# Patient Record
Sex: FEMALE | Race: OTHER | HISPANIC OR LATINO
[De-identification: names, ages, dates, MRNs, and addresses within clinical notes are randomized per-mention and may not be internally consistent; named-entity substitution may affect disease eponyms.]

---

## 2017-08-19 ENCOUNTER — APPOINTMENT (OUTPATIENT)
Dept: DERMATOLOGY | Facility: CLINIC | Age: 37
End: 2017-08-19

## 2017-12-09 ENCOUNTER — APPOINTMENT (OUTPATIENT)
Dept: DERMATOLOGY | Facility: CLINIC | Age: 37
End: 2017-12-09

## 2019-03-07 ENCOUNTER — APPOINTMENT (OUTPATIENT)
Dept: UROLOGY | Facility: CLINIC | Age: 39
End: 2019-03-07
Payer: COMMERCIAL

## 2019-03-07 VITALS
HEART RATE: 79 BPM | DIASTOLIC BLOOD PRESSURE: 87 MMHG | RESPIRATION RATE: 14 BRPM | SYSTOLIC BLOOD PRESSURE: 136 MMHG | HEIGHT: 64 IN | WEIGHT: 130 LBS | BODY MASS INDEX: 22.2 KG/M2

## 2019-03-07 DIAGNOSIS — Z87.39 PERSONAL HISTORY OF OTHER DISEASES OF THE MUSCULOSKELETAL SYSTEM AND CONNECTIVE TISSUE: ICD-10-CM

## 2019-03-07 DIAGNOSIS — Z86.79 PERSONAL HISTORY OF OTHER DISEASES OF THE CIRCULATORY SYSTEM: ICD-10-CM

## 2019-03-07 DIAGNOSIS — Z78.9 OTHER SPECIFIED HEALTH STATUS: ICD-10-CM

## 2019-03-07 LAB
BILIRUB UR QL STRIP: NORMAL
CLARITY UR: CLEAR
COLLECTION METHOD: NORMAL
GLUCOSE UR-MCNC: NORMAL
HCG UR QL: 0.2 EU/DL
HGB UR QL STRIP.AUTO: NORMAL
KETONES UR-MCNC: NORMAL
LEUKOCYTE ESTERASE UR QL STRIP: NORMAL
NITRITE UR QL STRIP: POSITIVE
PH UR STRIP: 7
PROT UR STRIP-MCNC: NORMAL
SP GR UR STRIP: 1.01

## 2019-03-07 PROCEDURE — 81003 URINALYSIS AUTO W/O SCOPE: CPT | Mod: QW

## 2019-03-07 PROCEDURE — 99203 OFFICE O/P NEW LOW 30 MIN: CPT | Mod: 25

## 2019-03-07 RX ORDER — NITROFURANTOIN (MONOHYDRATE/MACROCRYSTALS) 25; 75 MG/1; MG/1
100 CAPSULE ORAL TWICE DAILY
Qty: 14 | Refills: 1 | Status: ACTIVE | COMMUNITY
Start: 2019-03-07 | End: 1900-01-01

## 2019-03-07 NOTE — LETTER BODY
[Dear  ___] : Dear ~DAVID, [Consult Letter:] : I had the pleasure of evaluating your patient, [unfilled]. [( Thank you for referring [unfilled] for consultation for _____ )] : Thank you for referring [unfilled] for consultation for [unfilled] [Please see my note below.] : Please see my note below. [Consult Closing:] : Thank you very much for allowing me to participate in the care of this patient.  If you have any questions, please do not hesitate to contact me. [Sincerely,] : Sincerely, [FreeTextEntry3] : Kartik Hernandez MD\par  of Urology\par [facility]\par \par Offices:\par [facility] @\par [address], [place] [address]\par and\par [address], [place] [address], [address]\par and\par [address] [address]\par \par TEL: [phone]\par FAX: [phone]

## 2019-03-07 NOTE — ASSESSMENT
[FreeTextEntry1] : Reviewed outside records. \par Renal Ultrasound(2/7/2019):\par 1.6 cm and 7 mm right kidney stone, no hydronephrosis.\par \par Kidney stone:\par Will get CT scan. \par Nitrofurantoin 100 mg BID x 7 days. \par Will get Urinalysis and Urine culture.\par \par Recommended that patient call us or report to Emergency room if condition worsens- develops high grade fever, persistent nausea and vomiting, uncontrolled pain.\par \par Return to office after CT scan. \par

## 2019-03-07 NOTE — HISTORY OF PRESENT ILLNESS
[FreeTextEntry1] : 39 yo female presents for Kidney stone. \par Has been having off and on right flank pain, had Renal Ultrasound- right kidney stone. \par Has prior history of kidney stone, s/p PCNL at [facility]. No family history. \par Denies any difficulty with urination. Nocturia 1 x. \par Denies dysuria, hematuria, fever, chills or rigors.

## 2019-03-07 NOTE — PHYSICAL EXAM
[General Appearance - Well Developed] : well developed [Normal Appearance] : normal appearance [General Appearance - In No Acute Distress] : no acute distress [] : no respiratory distress [Abdomen Soft] : soft [Abdomen Tenderness] : non-tender [Abdomen Mass (___ Cm)] : no abdominal mass palpated [FreeTextEntry1] : Right CVAT + [Normal Station and Gait] : the gait and station were normal for the patient's age [Skin Color & Pigmentation] : normal skin color and pigmentation [No Focal Deficits] : no focal deficits [Oriented To Time, Place, And Person] : oriented to person, place, and time [No Palpable Adenopathy] : no palpable adenopathy

## 2019-03-08 LAB
APPEARANCE: ABNORMAL
BACTERIA: ABNORMAL
BILIRUBIN URINE: NEGATIVE
BLOOD URINE: NEGATIVE
COLOR: YELLOW
GLUCOSE QUALITATIVE U: NEGATIVE
HYALINE CASTS: 0 /LPF
KETONES URINE: NEGATIVE
LEUKOCYTE ESTERASE URINE: ABNORMAL
MICROSCOPIC-UA: NORMAL
NITRITE URINE: POSITIVE
PH URINE: 7.5
PROTEIN URINE: NEGATIVE
RED BLOOD CELLS URINE: 2 /HPF
SPECIFIC GRAVITY URINE: 1.02
SQUAMOUS EPITHELIAL CELLS: 1 /HPF
UROBILINOGEN URINE: NORMAL
WHITE BLOOD CELLS URINE: 22 /HPF

## 2019-03-11 LAB — BACTERIA UR CULT: ABNORMAL

## 2019-03-11 RX ORDER — NITROFURANTOIN (MONOHYDRATE/MACROCRYSTALS) 25; 75 MG/1; MG/1
100 CAPSULE ORAL TWICE DAILY
Qty: 14 | Refills: 1 | Status: ACTIVE | COMMUNITY
Start: 2019-03-11 | End: 1900-01-01

## 2019-03-21 ENCOUNTER — APPOINTMENT (OUTPATIENT)
Age: 39
End: 2019-03-21
Payer: COMMERCIAL

## 2019-03-21 VITALS — HEIGHT: 64 IN | BODY MASS INDEX: 22.2 KG/M2 | RESPIRATION RATE: 14 BRPM | HEART RATE: 80 BPM | WEIGHT: 130 LBS

## 2019-03-21 DIAGNOSIS — N39.0 URINARY TRACT INFECTION, SITE NOT SPECIFIED: ICD-10-CM

## 2019-03-21 PROCEDURE — 51701 INSERT BLADDER CATHETER: CPT

## 2019-03-21 PROCEDURE — 99214 OFFICE O/P EST MOD 30 MIN: CPT | Mod: 25

## 2019-03-21 NOTE — ASSESSMENT
[FreeTextEntry1] : UTI:\par Got catheterized specimen- Will get Urinalysis and Urine culture. Will inform results.\par \par Ureteral stone:\par Discussed the options of continued medical management Vs Ureteroscopy Vs Shock wave lithotripsy Vs Percutaneous nephrolithotomy. \par Risks and benefits of each modality were discussed. \par Explained to the pt that there is 50-60% chance to pass the stone of this size with tolerable symptoms on medical management. \par Pt wants to try medical management. \par Good oral hydration\par Continue Flomax\par PRN pain medication\par Strain urine\par Call for fevers, chill, severe nausea and vomiting, or severe pain or go to ER for worsening of medical condition may need urgent ureteral stent placement. \par \par Kidney stone:\par Will need Percutaneous nephrolithotomy, will arrange for it at later date. \par \par Return to office in 3 weeks or sooner if any issues- will get Renal and Bladder Ultrasound.

## 2019-03-21 NOTE — HISTORY OF PRESENT ILLNESS
[FreeTextEntry1] : 39 yo female presents for follow up. \par After last visit treated for positive Urine culture. \par Still has off and on right lower abdominal pain. \par Denies dysuria, hematuria, fever, chills or rigors. \par \par Initially seen for Kidney stone. \par Had been having off and on right flank pain, had Renal Ultrasound- right kidney stone. \par Has prior history of kidney stone, s/p PCNL at [facility]. No family history. \par

## 2019-03-22 LAB
APPEARANCE: CLEAR
BACTERIA: ABNORMAL
BILIRUBIN URINE: NEGATIVE
BLOOD URINE: ABNORMAL
COLOR: YELLOW
GLUCOSE QUALITATIVE U: NEGATIVE
HYALINE CASTS: 0 /LPF
KETONES URINE: NEGATIVE
LEUKOCYTE ESTERASE URINE: ABNORMAL
MICROSCOPIC-UA: NORMAL
NITRITE URINE: POSITIVE
PH URINE: 6.5
PROTEIN URINE: NEGATIVE
RED BLOOD CELLS URINE: 3 /HPF
SPECIFIC GRAVITY URINE: 1.02
SQUAMOUS EPITHELIAL CELLS: 2 /HPF
UROBILINOGEN URINE: NORMAL
WHITE BLOOD CELLS URINE: 32 /HPF

## 2019-03-25 LAB — BACTERIA UR CULT: ABNORMAL

## 2019-03-25 RX ORDER — NITROFURANTOIN (MONOHYDRATE/MACROCRYSTALS) 25; 75 MG/1; MG/1
100 CAPSULE ORAL TWICE DAILY
Qty: 20 | Refills: 1 | Status: ACTIVE | COMMUNITY
Start: 2019-03-25 | End: 1900-01-01

## 2019-03-26 DIAGNOSIS — N20.1 CALCULUS OF URETER: ICD-10-CM

## 2019-04-02 ENCOUNTER — OTHER (OUTPATIENT)
Age: 39
End: 2019-04-02

## 2019-04-02 ENCOUNTER — APPOINTMENT (OUTPATIENT)
Age: 39
End: 2019-04-02

## 2019-04-02 ENCOUNTER — OUTPATIENT (OUTPATIENT)
Dept: OUTPATIENT SERVICES | Facility: HOSPITAL | Age: 39
LOS: 1 days | Discharge: HOME | End: 2019-04-02
Payer: COMMERCIAL

## 2019-04-02 ENCOUNTER — RESULT REVIEW (OUTPATIENT)
Age: 39
End: 2019-04-02

## 2019-04-02 VITALS
OXYGEN SATURATION: 100 % | HEART RATE: 65 BPM | DIASTOLIC BLOOD PRESSURE: 85 MMHG | HEIGHT: 64 IN | WEIGHT: 130.07 LBS | RESPIRATION RATE: 18 BRPM | SYSTOLIC BLOOD PRESSURE: 135 MMHG | TEMPERATURE: 99 F

## 2019-04-02 VITALS
RESPIRATION RATE: 14 BRPM | HEART RATE: 70 BPM | TEMPERATURE: 98 F | OXYGEN SATURATION: 100 % | DIASTOLIC BLOOD PRESSURE: 84 MMHG | SYSTOLIC BLOOD PRESSURE: 138 MMHG

## 2019-04-02 DIAGNOSIS — Z87.442 PERSONAL HISTORY OF URINARY CALCULI: Chronic | ICD-10-CM

## 2019-04-02 DIAGNOSIS — Z90.49 ACQUIRED ABSENCE OF OTHER SPECIFIED PARTS OF DIGESTIVE TRACT: Chronic | ICD-10-CM

## 2019-04-02 DIAGNOSIS — Z98.891 HISTORY OF UTERINE SCAR FROM PREVIOUS SURGERY: Chronic | ICD-10-CM

## 2019-04-02 LAB
ABO RH CONFIRMATION: SIGNIFICANT CHANGE UP
BLD GP AB SCN SERPL QL: SIGNIFICANT CHANGE UP
HCG UR QL: NEGATIVE — SIGNIFICANT CHANGE UP
INR BLD: 1.04 RATIO — SIGNIFICANT CHANGE UP (ref 0.88–1.16)
PROTHROM AB SERPL-ACNC: 11.6 SEC — SIGNIFICANT CHANGE UP (ref 10–12.9)
SURGICAL PATHOLOGY STUDY: SIGNIFICANT CHANGE UP
TYPE + AB SCN PNL BLD: SIGNIFICANT CHANGE UP

## 2019-04-02 PROCEDURE — 88300 SURGICAL PATH GROSS: CPT | Mod: 26

## 2019-04-02 PROCEDURE — 52356 CYSTO/URETERO W/LITHOTRIPSY: CPT | Mod: RT

## 2019-04-02 RX ORDER — SODIUM CHLORIDE 9 MG/ML
1000 INJECTION, SOLUTION INTRAVENOUS
Qty: 0 | Refills: 0 | Status: DISCONTINUED | OUTPATIENT
Start: 2019-04-02 | End: 2019-04-02

## 2019-04-02 RX ORDER — PHENAZOPYRIDINE HCL 100 MG
1 TABLET ORAL
Qty: 9 | Refills: 0
Start: 2019-04-02 | End: 2019-04-04

## 2019-04-02 RX ORDER — PHENAZOPYRIDINE HCL 100 MG
200 TABLET ORAL ONCE
Qty: 0 | Refills: 0 | Status: COMPLETED | OUTPATIENT
Start: 2019-04-02 | End: 2019-04-02

## 2019-04-02 RX ORDER — SODIUM CHLORIDE 9 MG/ML
3 INJECTION INTRAMUSCULAR; INTRAVENOUS; SUBCUTANEOUS EVERY 8 HOURS
Qty: 0 | Refills: 0 | Status: DISCONTINUED | OUTPATIENT
Start: 2019-04-02 | End: 2019-04-02

## 2019-04-02 RX ORDER — ONDANSETRON 8 MG/1
4 TABLET, FILM COATED ORAL ONCE
Qty: 0 | Refills: 0 | Status: DISCONTINUED | OUTPATIENT
Start: 2019-04-02 | End: 2019-04-02

## 2019-04-02 RX ORDER — ACETAMINOPHEN 500 MG
975 TABLET ORAL ONCE
Qty: 0 | Refills: 0 | Status: COMPLETED | OUTPATIENT
Start: 2019-04-02 | End: 2019-04-02

## 2019-04-02 RX ORDER — OXYCODONE HYDROCHLORIDE 5 MG/1
5 TABLET ORAL ONCE
Qty: 0 | Refills: 0 | Status: DISCONTINUED | OUTPATIENT
Start: 2019-04-02 | End: 2019-04-02

## 2019-04-02 RX ORDER — FENTANYL CITRATE 50 UG/ML
50 INJECTION INTRAVENOUS
Qty: 0 | Refills: 0 | Status: DISCONTINUED | OUTPATIENT
Start: 2019-04-02 | End: 2019-04-02

## 2019-04-02 RX ORDER — FAMOTIDINE 10 MG/ML
20 INJECTION INTRAVENOUS ONCE
Qty: 0 | Refills: 0 | Status: COMPLETED | OUTPATIENT
Start: 2019-04-02 | End: 2019-04-02

## 2019-04-02 RX ADMIN — Medication 975 MILLIGRAM(S): at 10:00

## 2019-04-02 RX ADMIN — Medication 200 MILLIGRAM(S): at 13:35

## 2019-04-02 RX ADMIN — OXYCODONE HYDROCHLORIDE 5 MILLIGRAM(S): 5 TABLET ORAL at 13:35

## 2019-04-02 RX ADMIN — SODIUM CHLORIDE 100 MILLILITER(S): 9 INJECTION, SOLUTION INTRAVENOUS at 13:12

## 2019-04-02 RX ADMIN — FAMOTIDINE 20 MILLIGRAM(S): 10 INJECTION INTRAVENOUS at 10:00

## 2019-04-02 RX ADMIN — Medication 975 MILLIGRAM(S): at 10:01

## 2019-04-02 NOTE — ASU DISCHARGE PLAN (ADULT/PEDIATRIC) - CALL YOUR DOCTOR IF YOU HAVE ANY OF THE FOLLOWING:
Nausea and vomiting that does not stop/Pain not relieved by Medications/Inability to tolerate liquids or foods/Fever greater than (need to indicate Fahrenheit or Celsius)

## 2019-04-02 NOTE — BRIEF OPERATIVE NOTE - NSICDXBRIEFPOSTOP_GEN_ALL_CORE_FT
POST-OP DIAGNOSIS:  Right ureteral stone 02-Apr-2019 13:10:39  Kartik Hernandez  Right kidney stone 02-Apr-2019 13:10:32  Kartik Hernandez

## 2019-04-02 NOTE — ASU DISCHARGE PLAN (ADULT/PEDIATRIC) - NURSING INSTRUCTIONS
Review the post surgery written discharge  fact sheet.  Call MD if unable to urinate in 8 hours   A responsible adult should stay with you today.

## 2019-04-02 NOTE — BRIEF OPERATIVE NOTE - NSICDXBRIEFPROCEDURE_GEN_ALL_CORE_FT
PROCEDURES:  Ureteroscopy with laser lithotripsy and stent placement 02-Apr-2019 13:11:42 Right Kartik Hernandez

## 2019-04-02 NOTE — BRIEF OPERATIVE NOTE - NSICDXBRIEFPREOP_GEN_ALL_CORE_FT
PRE-OP DIAGNOSIS:  Right kidney stone 02-Apr-2019 13:10:23  Kartik Hernandez  Right ureteral stone 02-Apr-2019 13:10:02  Kartik Hernandez

## 2019-04-04 LAB
-  AMIKACIN: SIGNIFICANT CHANGE UP
-  AMOXICILLIN/CLAVULANIC ACID: SIGNIFICANT CHANGE UP
-  AMPICILLIN/SULBACTAM: SIGNIFICANT CHANGE UP
-  AMPICILLIN: SIGNIFICANT CHANGE UP
-  AMPICILLIN: SIGNIFICANT CHANGE UP
-  AZTREONAM: SIGNIFICANT CHANGE UP
-  CEFAZOLIN: SIGNIFICANT CHANGE UP
-  CEFAZOLIN: SIGNIFICANT CHANGE UP
-  CEFEPIME: SIGNIFICANT CHANGE UP
-  CEFOTAXIME: SIGNIFICANT CHANGE UP
-  CEFOXITIN: SIGNIFICANT CHANGE UP
-  CEFTAZIDIME: SIGNIFICANT CHANGE UP
-  CEFTRIAXONE: SIGNIFICANT CHANGE UP
-  CEFTRIAXONE: SIGNIFICANT CHANGE UP
-  CEFUROXIME: SIGNIFICANT CHANGE UP
-  CEPHALOTHIN: SIGNIFICANT CHANGE UP
-  CIPROFLOXACIN: SIGNIFICANT CHANGE UP
-  ERTAPENEM: SIGNIFICANT CHANGE UP
-  GENTAMICIN: SIGNIFICANT CHANGE UP
-  IMIPENEM: SIGNIFICANT CHANGE UP
-  LEVOFLOXACIN: SIGNIFICANT CHANGE UP
-  MEROPENEM: SIGNIFICANT CHANGE UP
-  NITROFURANTOIN: SIGNIFICANT CHANGE UP
-  NITROFURANTOIN: SIGNIFICANT CHANGE UP
-  PIPERACILLIN/TAZOBACTAM: SIGNIFICANT CHANGE UP
-  TETRACYCLINE: SIGNIFICANT CHANGE UP
-  TIGECYCLINE: SIGNIFICANT CHANGE UP
-  TOBRAMYCIN: SIGNIFICANT CHANGE UP
-  TRIMETHOPRIM/SULFAMETHOXAZOLE: SIGNIFICANT CHANGE UP
CULTURE RESULTS: SIGNIFICANT CHANGE UP
METHOD TYPE: SIGNIFICANT CHANGE UP
ORGANISM # SPEC MICROSCOPIC CNT: SIGNIFICANT CHANGE UP
ORGANISM # SPEC MICROSCOPIC CNT: SIGNIFICANT CHANGE UP
SPECIMEN SOURCE: SIGNIFICANT CHANGE UP

## 2019-04-08 DIAGNOSIS — Z90.49 ACQUIRED ABSENCE OF OTHER SPECIFIED PARTS OF DIGESTIVE TRACT: ICD-10-CM

## 2019-04-08 DIAGNOSIS — N20.2 CALCULUS OF KIDNEY WITH CALCULUS OF URETER: ICD-10-CM

## 2019-04-08 DIAGNOSIS — Z88.0 ALLERGY STATUS TO PENICILLIN: ICD-10-CM

## 2019-04-08 DIAGNOSIS — I10 ESSENTIAL (PRIMARY) HYPERTENSION: ICD-10-CM

## 2019-04-08 DIAGNOSIS — N39.0 URINARY TRACT INFECTION, SITE NOT SPECIFIED: ICD-10-CM

## 2019-04-08 LAB — COMPN STONE: SIGNIFICANT CHANGE UP

## 2019-04-11 ENCOUNTER — APPOINTMENT (OUTPATIENT)
Dept: UROLOGY | Facility: CLINIC | Age: 39
End: 2019-04-11

## 2019-04-17 ENCOUNTER — MEDICATION RENEWAL (OUTPATIENT)
Age: 39
End: 2019-04-17

## 2019-04-30 PROBLEM — I10 ESSENTIAL (PRIMARY) HYPERTENSION: Chronic | Status: ACTIVE | Noted: 2019-04-02

## 2019-04-30 PROBLEM — N20.0 CALCULUS OF KIDNEY: Chronic | Status: ACTIVE | Noted: 2019-04-02

## 2019-05-02 ENCOUNTER — APPOINTMENT (OUTPATIENT)
Dept: UROLOGY | Facility: CLINIC | Age: 39
End: 2019-05-02

## 2019-05-02 ENCOUNTER — OTHER (OUTPATIENT)
Age: 39
End: 2019-05-02

## 2019-05-09 ENCOUNTER — APPOINTMENT (OUTPATIENT)
Dept: UROLOGY | Facility: CLINIC | Age: 39
End: 2019-05-09
Payer: COMMERCIAL

## 2019-05-09 VITALS
HEIGHT: 64 IN | WEIGHT: 120 LBS | BODY MASS INDEX: 20.49 KG/M2 | RESPIRATION RATE: 14 BRPM | HEART RATE: 60 BPM | SYSTOLIC BLOOD PRESSURE: 138 MMHG | DIASTOLIC BLOOD PRESSURE: 95 MMHG

## 2019-05-09 PROCEDURE — 99214 OFFICE O/P EST MOD 30 MIN: CPT

## 2019-05-13 NOTE — HISTORY OF PRESENT ILLNESS
[FreeTextEntry1] : 39 yo female presents for follow up. \par Patient primarily Cook Islander speaking, with limited English. Visit conducted with help of Cook Islander speaking staff.\par s/p Right Ureteroscopy, laser lithotripsy, stone extraction and ureteral stent placement done on 4/2/19 at [facility]. \par Has right sided abdominal pressure, off and on dysuria and hematuria.\par Denies fever, chills or rigors. \par Here to discuss further management of right staghorn calculus.\par \par Initially seen for Kidney stone. \par Had been having off and on right flank pain, had Renal Ultrasound- right kidney stone. \par Has prior history of kidney stone, s/p PCNL at [facility]. No family history. \par

## 2019-05-13 NOTE — ASSESSMENT
[FreeTextEntry1] : Right Kidney stone:\par Discussed the management options for kidney stone- Intervention via Percutaneous nephrolithotomy  Vs Extracorporeal shock wave lithotripsy Vs Ureteroscopy.\par Risks and benefits of each modality were discussed. Recommended Percutaneous nephrolithotomy. \par Risks/benefits/success/recovery expectations all reviewed at length inclusive of infection/sepsis, bleeding, need for secondary procedures or secondary stages such as embolization or open surgery, and even risks of death due to acute issues superimposed on comorbidities. \par \par Pt scheduled for Percutaneous nephrolithotomy with Dr Hoenig at [facility] on 5/24/19.

## 2019-05-16 ENCOUNTER — MEDICATION RENEWAL (OUTPATIENT)
Age: 39
End: 2019-05-16

## 2019-05-16 RX ORDER — TAMSULOSIN HYDROCHLORIDE 0.4 MG/1
0.4 CAPSULE ORAL
Qty: 30 | Refills: 0 | Status: ACTIVE | COMMUNITY
Start: 2019-03-21 | End: 1900-01-01

## 2019-05-17 ENCOUNTER — OUTPATIENT (OUTPATIENT)
Dept: OUTPATIENT SERVICES | Facility: HOSPITAL | Age: 39
LOS: 1 days | End: 2019-05-17
Payer: COMMERCIAL

## 2019-05-17 VITALS
OXYGEN SATURATION: 100 % | SYSTOLIC BLOOD PRESSURE: 138 MMHG | TEMPERATURE: 99 F | HEART RATE: 85 BPM | RESPIRATION RATE: 14 BRPM | DIASTOLIC BLOOD PRESSURE: 89 MMHG | WEIGHT: 126.99 LBS | HEIGHT: 62 IN

## 2019-05-17 VITALS
RESPIRATION RATE: 15 BRPM | OXYGEN SATURATION: 100 % | HEART RATE: 85 BPM | TEMPERATURE: 99 F | SYSTOLIC BLOOD PRESSURE: 138 MMHG | HEIGHT: 62 IN | WEIGHT: 126.99 LBS | DIASTOLIC BLOOD PRESSURE: 83 MMHG

## 2019-05-17 DIAGNOSIS — N20.0 CALCULUS OF KIDNEY: ICD-10-CM

## 2019-05-17 DIAGNOSIS — Z90.49 ACQUIRED ABSENCE OF OTHER SPECIFIED PARTS OF DIGESTIVE TRACT: Chronic | ICD-10-CM

## 2019-05-17 DIAGNOSIS — Z98.890 OTHER SPECIFIED POSTPROCEDURAL STATES: Chronic | ICD-10-CM

## 2019-05-17 DIAGNOSIS — Z87.442 PERSONAL HISTORY OF URINARY CALCULI: Chronic | ICD-10-CM

## 2019-05-17 DIAGNOSIS — I10 ESSENTIAL (PRIMARY) HYPERTENSION: ICD-10-CM

## 2019-05-17 DIAGNOSIS — Z98.891 HISTORY OF UTERINE SCAR FROM PREVIOUS SURGERY: Chronic | ICD-10-CM

## 2019-05-17 DIAGNOSIS — Z01.818 ENCOUNTER FOR OTHER PREPROCEDURAL EXAMINATION: ICD-10-CM

## 2019-05-17 LAB
ANION GAP SERPL CALC-SCNC: 10 MMOL/L — SIGNIFICANT CHANGE UP (ref 5–17)
BLD GP AB SCN SERPL QL: NEGATIVE — SIGNIFICANT CHANGE UP
BUN SERPL-MCNC: 10 MG/DL — SIGNIFICANT CHANGE UP (ref 7–23)
CALCIUM SERPL-MCNC: 9.1 MG/DL — SIGNIFICANT CHANGE UP (ref 8.4–10.5)
CHLORIDE SERPL-SCNC: 104 MMOL/L — SIGNIFICANT CHANGE UP (ref 96–108)
CO2 SERPL-SCNC: 25 MMOL/L — SIGNIFICANT CHANGE UP (ref 22–31)
CREAT SERPL-MCNC: 0.69 MG/DL — SIGNIFICANT CHANGE UP (ref 0.5–1.3)
GLUCOSE SERPL-MCNC: 86 MG/DL — SIGNIFICANT CHANGE UP (ref 70–99)
POTASSIUM SERPL-MCNC: 4 MMOL/L — SIGNIFICANT CHANGE UP (ref 3.5–5.3)
POTASSIUM SERPL-SCNC: 4 MMOL/L — SIGNIFICANT CHANGE UP (ref 3.5–5.3)
RH IG SCN BLD-IMP: POSITIVE — SIGNIFICANT CHANGE UP
SODIUM SERPL-SCNC: 139 MMOL/L — SIGNIFICANT CHANGE UP (ref 135–145)

## 2019-05-17 PROCEDURE — 86850 RBC ANTIBODY SCREEN: CPT

## 2019-05-17 PROCEDURE — 87086 URINE CULTURE/COLONY COUNT: CPT

## 2019-05-17 PROCEDURE — 86901 BLOOD TYPING SEROLOGIC RH(D): CPT

## 2019-05-17 PROCEDURE — 87186 SC STD MICRODIL/AGAR DIL: CPT

## 2019-05-17 PROCEDURE — G0463: CPT

## 2019-05-17 PROCEDURE — 85027 COMPLETE CBC AUTOMATED: CPT

## 2019-05-17 PROCEDURE — 86900 BLOOD TYPING SEROLOGIC ABO: CPT

## 2019-05-17 PROCEDURE — 80048 BASIC METABOLIC PNL TOTAL CA: CPT

## 2019-05-17 RX ORDER — METOPROLOL TARTRATE 50 MG
1 TABLET ORAL
Qty: 0 | Refills: 0 | DISCHARGE

## 2019-05-17 NOTE — H&P PST ADULT - LANGUAGE ASSISTANCE NEEDED
Yes-Patient/Caregiver accepts free interpretation services...
Yes-Patient/Caregiver accepts free interpretation services...

## 2019-05-17 NOTE — H&P PST ADULT - NSANTHOSAYNRD_GEN_A_CORE
No. OLIVE screening performed.  STOP BANG Legend: 0-2 = LOW Risk; 3-4 = INTERMEDIATE Risk; 5-8 = HIGH Risk
No. OLIVE screening performed.  STOP BANG Legend: 0-2 = LOW Risk; 3-4 = INTERMEDIATE Risk; 5-8 = HIGH Risk

## 2019-05-17 NOTE — H&P PST ADULT - NSICDXPASTMEDICALHX_GEN_ALL_CORE_FT
PAST MEDICAL HISTORY:  HTN (hypertension)     Kidney stones
PAST MEDICAL HISTORY:  HTN (hypertension)     Kidney stones

## 2019-05-17 NOTE — H&P PST ADULT - NSICDXPASTSURGICALHX_GEN_ALL_CORE_FT
PAST SURGICAL HISTORY:  H/O:  section     History of appendectomy     History of kidney stones right PCNL    S/P hernia repair right femoral

## 2019-05-17 NOTE — H&P PST ADULT - HISTORY OF PRESENT ILLNESS
39 y/o   37 yo female presents for follow up.   Patient primarily Yi speaking, with limited English. Visit conducted with help of Yi speaking staff.  s/p Right Ureteroscopy, laser lithotripsy, stone extraction and ureteral stent placement done on 4/2/19 at [facility].   Has right sided abdominal pressure, off and on dysuria and hematuria.  Denies fever, chills or rigors.   Here to discuss further management of right staghorn calculus.    Initially seen for Kidney stone.   Had been having off and on right flank pain, had Renal Ultrasound- right kidney stone.   Has prior history of kidney stone, s/p PCNL at [facility]. No family history. 37 yo female presents for follow up.   Patient primarily Syrian speaking, with limited English. Visit conducted with help of Syrian speaking staff.  s/p Right Ureteroscopy, laser lithotripsy, stone extraction and ureteral stent placement done on 4/2/19 at [facility].   Has right sided abdominal pressure, off and on dysuria and hematuria.  Denies fever, chills or rigors.   Here to discuss further management of right staghorn calculus.    Initially seen for Kidney stone.   Had been having off and on right flank pain, had Renal Ultrasound- right kidney stone.   Has prior history of kidney stone, s/p PCNL at [facility]. No family history.

## 2019-05-17 NOTE — H&P PST ADULT - ATTENDING COMMENTS
Urine C&S positive for e coli, treated with cefdinir.  For right PCNL given large stones, possible multi-access, multistage for staghorn stone

## 2019-05-17 NOTE — H&P PST ADULT - HISTORY OF PRESENT ILLNESS
39 y/o Luxembourgish speaking female,  # [id number], w/ pmhx of HTN, renal stones s/p right ureteroscopy, laser lithotripsy, stone extraction and ureteral stent placement done on 4/2/19 at [facility] for right staghorn calculus. Reports continued right CVA tenderness but denies recent fever/chills, dysuria, gross hematuria. Presents now for right ureteroscopy, right PCNL. 37 y/o Belarusian speaking female,  # [id number], w/ pmhx of HTN, renal stones s/p right ureteroscopy, laser lithotripsy, stone extraction and ureteral stent placement done on 4/2/19 at [facility] for right calculus. Reports continued right CVA tenderness but denies recent fever/chills, dysuria, gross hematuria. Presents now for right ureteroscopy, right PCNL.

## 2019-05-17 NOTE — H&P PST ADULT - CONSTITUTIONAL DETAILS
well-nourished/well-developed/no distress/well-groomed
no distress/well-developed/well-groomed/well-nourished

## 2019-05-17 NOTE — H&P PST ADULT - NSICDXPROBLEM_GEN_ALL_CORE_FT
PROBLEM DIAGNOSES  Problem: Kidney stones  Assessment and Plan: Scheduled right PCNL  urine cx collected      Problem: HTN (hypertension)  Assessment and Plan: c/w metoprolol as prescribed
PROBLEM DIAGNOSES  Problem: Kidney stones  Assessment and Plan: Scheduled right PCNL  urine cx collected      Problem: HTN (hypertension)  Assessment and Plan: c/w metoprolol as prescribed

## 2019-05-17 NOTE — H&P PST ADULT - NEGATIVE GENERAL GENITOURINARY SYMPTOMS
no hematuria/no renal colic/no flank pain L/no dysuria/normal urinary frequency/no urinary hesitancy/no bladder infections/no incontinence/no nocturia
no dysuria/no nocturia/no incontinence/no hematuria/no bladder infections

## 2019-05-17 NOTE — H&P PST ADULT - NSICDXPASTSURGICALHX_GEN_ALL_CORE_FT
PAST SURGICAL HISTORY:  H/O:  section     History of appendectomy     History of kidney stones s/p litho    S/P hernia repair right femoral PAST SURGICAL HISTORY:  H/O:  section     History of appendectomy     History of kidney stones right PCNL    S/P hernia repair right femoral

## 2019-05-18 LAB
HCT VFR BLD CALC: 34.7 % — SIGNIFICANT CHANGE UP (ref 34.5–45)
HGB BLD-MCNC: 10.8 G/DL — LOW (ref 11.5–15.5)
MCHC RBC-ENTMCNC: 26.3 PG — LOW (ref 27–34)
MCHC RBC-ENTMCNC: 31.1 GM/DL — LOW (ref 32–36)
MCV RBC AUTO: 84.6 FL — SIGNIFICANT CHANGE UP (ref 80–100)
PLATELET # BLD AUTO: 345 K/UL — SIGNIFICANT CHANGE UP (ref 150–400)
RBC # BLD: 4.1 M/UL — SIGNIFICANT CHANGE UP (ref 3.8–5.2)
RBC # FLD: 14.6 % — HIGH (ref 10.3–14.5)
WBC # BLD: 6.88 K/UL — SIGNIFICANT CHANGE UP (ref 3.8–10.5)
WBC # FLD AUTO: 6.88 K/UL — SIGNIFICANT CHANGE UP (ref 3.8–10.5)

## 2019-05-19 LAB
-  AMIKACIN: SIGNIFICANT CHANGE UP
-  AMPICILLIN/SULBACTAM: SIGNIFICANT CHANGE UP
-  AMPICILLIN: SIGNIFICANT CHANGE UP
-  AZTREONAM: SIGNIFICANT CHANGE UP
-  CEFEPIME: SIGNIFICANT CHANGE UP
-  CEFOXITIN: SIGNIFICANT CHANGE UP
-  CEFTRIAXONE: SIGNIFICANT CHANGE UP
-  CIPROFLOXACIN: SIGNIFICANT CHANGE UP
-  ERTAPENEM: SIGNIFICANT CHANGE UP
-  GENTAMICIN: SIGNIFICANT CHANGE UP
-  IMIPENEM: SIGNIFICANT CHANGE UP
-  LEVOFLOXACIN: SIGNIFICANT CHANGE UP
-  MEROPENEM: SIGNIFICANT CHANGE UP
-  NITROFURANTOIN: SIGNIFICANT CHANGE UP
-  PIPERACILLIN/TAZOBACTAM: SIGNIFICANT CHANGE UP
-  TIGECYCLINE: SIGNIFICANT CHANGE UP
-  TOBRAMYCIN: SIGNIFICANT CHANGE UP
-  TRIMETHOPRIM/SULFAMETHOXAZOLE: SIGNIFICANT CHANGE UP
CULTURE RESULTS: SIGNIFICANT CHANGE UP
METHOD TYPE: SIGNIFICANT CHANGE UP
ORGANISM # SPEC MICROSCOPIC CNT: SIGNIFICANT CHANGE UP
ORGANISM # SPEC MICROSCOPIC CNT: SIGNIFICANT CHANGE UP
SPECIMEN SOURCE: SIGNIFICANT CHANGE UP

## 2019-05-20 RX ORDER — CEFDINIR 300 MG/1
300 CAPSULE ORAL
Qty: 14 | Refills: 0 | Status: ACTIVE | COMMUNITY
Start: 2019-05-20 | End: 1900-01-01

## 2019-05-23 ENCOUNTER — RESULT REVIEW (OUTPATIENT)
Age: 39
End: 2019-05-23

## 2019-05-23 ENCOUNTER — TRANSCRIPTION ENCOUNTER (OUTPATIENT)
Age: 39
End: 2019-05-23

## 2019-05-23 VITALS
SYSTOLIC BLOOD PRESSURE: 138 MMHG | HEART RATE: 85 BPM | HEIGHT: 63 IN | TEMPERATURE: 99 F | DIASTOLIC BLOOD PRESSURE: 89 MMHG | RESPIRATION RATE: 18 BRPM | OXYGEN SATURATION: 100 % | WEIGHT: 126.99 LBS

## 2019-05-23 NOTE — PRE-ANESTHESIA EVALUATION ADULT - NSANTHOSAYNRD_GEN_A_CORE
No. OLIVE screening performed.  STOP BANG Legend: 0-2 = LOW Risk; 3-4 = INTERMEDIATE Risk; 5-8 = HIGH Risk

## 2019-05-23 NOTE — PRE-ANESTHESIA EVALUATION ADULT - NSPROPOSEDPROCEDFT_GEN_ALL_CORE
Cystoscopy, R Retrograde pyelogram, R percutaneous nephrostolithotomy Cystoscopy, Right Retrograde pyelogram, Right percutaneous nephrostolithotomy

## 2019-05-24 ENCOUNTER — APPOINTMENT (OUTPATIENT)
Dept: UROLOGY | Facility: HOSPITAL | Age: 39
End: 2019-05-24

## 2019-05-24 ENCOUNTER — INPATIENT (INPATIENT)
Facility: HOSPITAL | Age: 39
LOS: 1 days | Discharge: HOME | DRG: 660 | End: 2019-05-26
Attending: UROLOGY | Admitting: UROLOGY
Payer: COMMERCIAL

## 2019-05-24 DIAGNOSIS — Z98.890 OTHER SPECIFIED POSTPROCEDURAL STATES: Chronic | ICD-10-CM

## 2019-05-24 DIAGNOSIS — N20.0 CALCULUS OF KIDNEY: ICD-10-CM

## 2019-05-24 DIAGNOSIS — Z87.442 PERSONAL HISTORY OF URINARY CALCULI: Chronic | ICD-10-CM

## 2019-05-24 DIAGNOSIS — Z90.49 ACQUIRED ABSENCE OF OTHER SPECIFIED PARTS OF DIGESTIVE TRACT: Chronic | ICD-10-CM

## 2019-05-24 DIAGNOSIS — Z98.891 HISTORY OF UTERINE SCAR FROM PREVIOUS SURGERY: Chronic | ICD-10-CM

## 2019-05-24 DIAGNOSIS — Z01.818 ENCOUNTER FOR OTHER PREPROCEDURAL EXAMINATION: ICD-10-CM

## 2019-05-24 LAB
ANION GAP SERPL CALC-SCNC: 12 MMOL/L — SIGNIFICANT CHANGE UP (ref 5–17)
BUN SERPL-MCNC: 12 MG/DL — SIGNIFICANT CHANGE UP (ref 7–23)
CALCIUM SERPL-MCNC: 7.9 MG/DL — LOW (ref 8.4–10.5)
CHLORIDE SERPL-SCNC: 108 MMOL/L — SIGNIFICANT CHANGE UP (ref 96–108)
CO2 SERPL-SCNC: 18 MMOL/L — LOW (ref 22–31)
CREAT SERPL-MCNC: 0.73 MG/DL — SIGNIFICANT CHANGE UP (ref 0.5–1.3)
GLUCOSE SERPL-MCNC: 107 MG/DL — HIGH (ref 70–99)
HCG UR QL: NEGATIVE — SIGNIFICANT CHANGE UP
HCT VFR BLD CALC: 30.8 % — LOW (ref 34.5–45)
HGB BLD-MCNC: 10.5 G/DL — LOW (ref 11.5–15.5)
MCHC RBC-ENTMCNC: 28.4 PG — SIGNIFICANT CHANGE UP (ref 27–34)
MCHC RBC-ENTMCNC: 34 GM/DL — SIGNIFICANT CHANGE UP (ref 32–36)
MCV RBC AUTO: 83.4 FL — SIGNIFICANT CHANGE UP (ref 80–100)
PLATELET # BLD AUTO: 230 K/UL — SIGNIFICANT CHANGE UP (ref 150–400)
POTASSIUM SERPL-MCNC: 4.4 MMOL/L — SIGNIFICANT CHANGE UP (ref 3.5–5.3)
POTASSIUM SERPL-SCNC: 4.4 MMOL/L — SIGNIFICANT CHANGE UP (ref 3.5–5.3)
RBC # BLD: 3.69 M/UL — LOW (ref 3.8–5.2)
RBC # FLD: 14.6 % — HIGH (ref 10.3–14.5)
RH IG SCN BLD-IMP: POSITIVE — SIGNIFICANT CHANGE UP
SODIUM SERPL-SCNC: 138 MMOL/L — SIGNIFICANT CHANGE UP (ref 135–145)
WBC # BLD: 3.7 K/UL — LOW (ref 3.8–10.5)
WBC # FLD AUTO: 3.7 K/UL — LOW (ref 3.8–10.5)

## 2019-05-24 PROCEDURE — 88300 SURGICAL PATH GROSS: CPT | Mod: 26

## 2019-05-24 PROCEDURE — 71045 X-RAY EXAM CHEST 1 VIEW: CPT | Mod: 26

## 2019-05-24 RX ORDER — METOPROLOL TARTRATE 50 MG
1 TABLET ORAL
Qty: 0 | Refills: 0 | DISCHARGE

## 2019-05-24 RX ORDER — CIPROFLOXACIN LACTATE 400MG/40ML
400 VIAL (ML) INTRAVENOUS EVERY 12 HOURS
Refills: 0 | Status: DISCONTINUED | OUTPATIENT
Start: 2019-05-24 | End: 2019-05-24

## 2019-05-24 RX ORDER — SODIUM CHLORIDE 9 MG/ML
1000 INJECTION INTRAMUSCULAR; INTRAVENOUS; SUBCUTANEOUS ONCE
Refills: 0 | Status: COMPLETED | OUTPATIENT
Start: 2019-05-24 | End: 2019-05-24

## 2019-05-24 RX ORDER — LIDOCAINE HCL 20 MG/ML
0.2 VIAL (ML) INJECTION ONCE
Refills: 0 | Status: DISCONTINUED | OUTPATIENT
Start: 2019-05-24 | End: 2019-05-24

## 2019-05-24 RX ORDER — HYDROMORPHONE HYDROCHLORIDE 2 MG/ML
0.5 INJECTION INTRAMUSCULAR; INTRAVENOUS; SUBCUTANEOUS
Refills: 0 | Status: DISCONTINUED | OUTPATIENT
Start: 2019-05-24 | End: 2019-05-24

## 2019-05-24 RX ORDER — KETOROLAC TROMETHAMINE 30 MG/ML
15 SYRINGE (ML) INJECTION EVERY 6 HOURS
Refills: 0 | Status: DISCONTINUED | OUTPATIENT
Start: 2019-05-24 | End: 2019-05-26

## 2019-05-24 RX ORDER — CEFTRIAXONE 500 MG/1
1 INJECTION, POWDER, FOR SOLUTION INTRAMUSCULAR; INTRAVENOUS EVERY 24 HOURS
Refills: 0 | Status: DISCONTINUED | OUTPATIENT
Start: 2019-05-24 | End: 2019-05-26

## 2019-05-24 RX ORDER — HEPARIN SODIUM 5000 [USP'U]/ML
5000 INJECTION INTRAVENOUS; SUBCUTANEOUS EVERY 8 HOURS
Refills: 0 | Status: DISCONTINUED | OUTPATIENT
Start: 2019-05-24 | End: 2019-05-26

## 2019-05-24 RX ORDER — ONDANSETRON 8 MG/1
4 TABLET, FILM COATED ORAL ONCE
Refills: 0 | Status: DISCONTINUED | OUTPATIENT
Start: 2019-05-24 | End: 2019-05-24

## 2019-05-24 RX ORDER — SODIUM CHLORIDE 9 MG/ML
3 INJECTION INTRAMUSCULAR; INTRAVENOUS; SUBCUTANEOUS EVERY 8 HOURS
Refills: 0 | Status: DISCONTINUED | OUTPATIENT
Start: 2019-05-24 | End: 2019-05-24

## 2019-05-24 RX ORDER — OXYCODONE HYDROCHLORIDE 5 MG/1
5 TABLET ORAL EVERY 4 HOURS
Refills: 0 | Status: DISCONTINUED | OUTPATIENT
Start: 2019-05-24 | End: 2019-05-26

## 2019-05-24 RX ORDER — METOPROLOL TARTRATE 50 MG
25 TABLET ORAL DAILY
Refills: 0 | Status: DISCONTINUED | OUTPATIENT
Start: 2019-05-24 | End: 2019-05-26

## 2019-05-24 RX ORDER — TAMSULOSIN HYDROCHLORIDE 0.4 MG/1
1 CAPSULE ORAL
Qty: 0 | Refills: 0 | DISCHARGE

## 2019-05-24 RX ORDER — SODIUM CHLORIDE 9 MG/ML
1000 INJECTION, SOLUTION INTRAVENOUS
Refills: 0 | Status: DISCONTINUED | OUTPATIENT
Start: 2019-05-24 | End: 2019-05-25

## 2019-05-24 RX ORDER — FAMOTIDINE 10 MG/ML
20 INJECTION INTRAVENOUS ONCE
Refills: 0 | Status: DISCONTINUED | OUTPATIENT
Start: 2019-05-24 | End: 2019-05-26

## 2019-05-24 RX ORDER — ACETAMINOPHEN 500 MG
1000 TABLET ORAL ONCE
Refills: 0 | Status: COMPLETED | OUTPATIENT
Start: 2019-05-24 | End: 2019-05-24

## 2019-05-24 RX ORDER — ACETAMINOPHEN 500 MG
650 TABLET ORAL EVERY 6 HOURS
Refills: 0 | Status: COMPLETED | OUTPATIENT
Start: 2019-05-24 | End: 2019-05-26

## 2019-05-24 RX ORDER — CEFTRIAXONE 500 MG/1
1000 INJECTION, POWDER, FOR SOLUTION INTRAMUSCULAR; INTRAVENOUS EVERY 24 HOURS
Refills: 0 | Status: DISCONTINUED | OUTPATIENT
Start: 2019-05-24 | End: 2019-05-24

## 2019-05-24 RX ORDER — METHENAMINE MANDELATE 1 G
1 TABLET ORAL
Qty: 0 | Refills: 0 | DISCHARGE

## 2019-05-24 RX ORDER — HYDROMORPHONE HYDROCHLORIDE 2 MG/ML
0.25 INJECTION INTRAMUSCULAR; INTRAVENOUS; SUBCUTANEOUS
Refills: 0 | Status: DISCONTINUED | OUTPATIENT
Start: 2019-05-24 | End: 2019-05-24

## 2019-05-24 RX ORDER — CIPROFLOXACIN LACTATE 400MG/40ML
400 VIAL (ML) INTRAVENOUS ONCE
Refills: 0 | Status: DISCONTINUED | OUTPATIENT
Start: 2019-05-24 | End: 2019-05-24

## 2019-05-24 RX ADMIN — HYDROMORPHONE HYDROCHLORIDE 0.25 MILLIGRAM(S): 2 INJECTION INTRAMUSCULAR; INTRAVENOUS; SUBCUTANEOUS at 10:45

## 2019-05-24 RX ADMIN — CEFTRIAXONE 100 GRAM(S): 500 INJECTION, POWDER, FOR SOLUTION INTRAMUSCULAR; INTRAVENOUS at 14:50

## 2019-05-24 RX ADMIN — Medication 15 MILLIGRAM(S): at 22:03

## 2019-05-24 RX ADMIN — Medication 15 MILLIGRAM(S): at 15:20

## 2019-05-24 RX ADMIN — Medication 15 MILLIGRAM(S): at 22:35

## 2019-05-24 RX ADMIN — Medication 1000 MILLIGRAM(S): at 11:15

## 2019-05-24 RX ADMIN — SODIUM CHLORIDE 1000 MILLILITER(S): 9 INJECTION INTRAMUSCULAR; INTRAVENOUS; SUBCUTANEOUS at 18:27

## 2019-05-24 RX ADMIN — Medication 15 MILLIGRAM(S): at 14:50

## 2019-05-24 RX ADMIN — HYDROMORPHONE HYDROCHLORIDE 0.25 MILLIGRAM(S): 2 INJECTION INTRAMUSCULAR; INTRAVENOUS; SUBCUTANEOUS at 11:00

## 2019-05-24 RX ADMIN — HEPARIN SODIUM 5000 UNIT(S): 5000 INJECTION INTRAVENOUS; SUBCUTANEOUS at 14:49

## 2019-05-24 RX ADMIN — Medication 650 MILLIGRAM(S): at 18:57

## 2019-05-24 RX ADMIN — Medication 650 MILLIGRAM(S): at 18:27

## 2019-05-24 RX ADMIN — HEPARIN SODIUM 5000 UNIT(S): 5000 INJECTION INTRAVENOUS; SUBCUTANEOUS at 22:05

## 2019-05-24 RX ADMIN — Medication 400 MILLIGRAM(S): at 10:56

## 2019-05-24 RX ADMIN — SODIUM CHLORIDE 125 MILLILITER(S): 9 INJECTION, SOLUTION INTRAVENOUS at 10:56

## 2019-05-24 NOTE — BRIEF OPERATIVE NOTE - COMMENTS
Patient required nephrostomy tube to control bleeding at the conclusion of the case and will require inpatient management    Patient had preoperative positive urine culture and will require inpatient IV antibiotics

## 2019-05-24 NOTE — PROVIDER CONTACT NOTE (OTHER) - ASSESSMENT
pt complaining of numbness throughout body. VSS. Neurovascular assessment intact. Pt has yet to get OOB post procedure.

## 2019-05-24 NOTE — PROGRESS NOTE ADULT - SUBJECTIVE AND OBJECTIVE BOX
Post op Check    Pt seen and examined without complaints. Pain is controlled. Denies SOB/CP/N/V.     Vital Signs Last 24 Hrs  T(C): 36.4 (24 May 2019 15:00), Max: 37 (23 May 2019 19:37)  T(F): 97.6 (24 May 2019 15:00), Max: 98.4 (24 May 2019 06:11)  HR: 91 (24 May 2019 15:00) (66 - 98)  BP: 92/63 (24 May 2019 15:00) (92/63 - 138/89)  BP(mean): 78 (24 May 2019 11:15) (78 - 90)  RR: 18 (24 May 2019 15:00) (15 - 20)  SpO2: 99% (24 May 2019 15:00) (97% - 100%)    I&O's Summary    24 May 2019 07:01  -  24 May 2019 15:44  --------------------------------------------------------  IN: 400 mL / OUT: 875 mL / NET: -475 mL        Physical Exam  Gen: awake alert NAD AXOX3  Pulm: No respiratory distress, no subcostal retractions  CV: RRR, no JVD  Abd: Soft, NT, ND  Back: tubes in place no hematoma/ ecchymosis  dressings saturated with serosang output.   outputs dilute cherry    :   seth strawberry                           10.5   3.7   )-----------( 230      ( 24 May 2019 11:01 )             30.8       05-24    138  |  108  |  12  ----------------------------<  107<H>  4.4   |  18<L>  |  0.73    Ca    7.9<L>      24 May 2019 11:01

## 2019-05-24 NOTE — BRIEF OPERATIVE NOTE - OPERATION/FINDINGS
pre/post-op: R nephrolithiasis  procedure: cysto, R stent removal, R retrograde pyelogram  R PCNL, 2 sticks, 2 dilations, , nephrostogram, insertion of right nephrostomy tube, insertion of right nephroureteral tube

## 2019-05-25 LAB
ANION GAP SERPL CALC-SCNC: 11 MMOL/L — SIGNIFICANT CHANGE UP (ref 5–17)
BUN SERPL-MCNC: 10 MG/DL — SIGNIFICANT CHANGE UP (ref 7–23)
CALCIUM SERPL-MCNC: 7.9 MG/DL — LOW (ref 8.4–10.5)
CHLORIDE SERPL-SCNC: 110 MMOL/L — HIGH (ref 96–108)
CO2 SERPL-SCNC: 20 MMOL/L — LOW (ref 22–31)
CREAT SERPL-MCNC: 0.86 MG/DL — SIGNIFICANT CHANGE UP (ref 0.5–1.3)
GLUCOSE SERPL-MCNC: 102 MG/DL — HIGH (ref 70–99)
HCT VFR BLD CALC: 23.6 % — LOW (ref 34.5–45)
HCT VFR BLD CALC: 25.1 % — LOW (ref 34.5–45)
HGB BLD-MCNC: 7.5 G/DL — LOW (ref 11.5–15.5)
HGB BLD-MCNC: 8 G/DL — LOW (ref 11.5–15.5)
MCHC RBC-ENTMCNC: 26.7 PG — LOW (ref 27–34)
MCHC RBC-ENTMCNC: 26.9 PG — LOW (ref 27–34)
MCHC RBC-ENTMCNC: 31.9 GM/DL — LOW (ref 32–36)
MCHC RBC-ENTMCNC: 31.9 GM/DL — LOW (ref 32–36)
MCV RBC AUTO: 83.9 FL — SIGNIFICANT CHANGE UP (ref 80–100)
MCV RBC AUTO: 84.4 FL — SIGNIFICANT CHANGE UP (ref 80–100)
PLATELET # BLD AUTO: 169 K/UL — SIGNIFICANT CHANGE UP (ref 150–400)
PLATELET # BLD AUTO: 190 K/UL — SIGNIFICANT CHANGE UP (ref 150–400)
POTASSIUM SERPL-MCNC: 3.7 MMOL/L — SIGNIFICANT CHANGE UP (ref 3.5–5.3)
POTASSIUM SERPL-SCNC: 3.7 MMOL/L — SIGNIFICANT CHANGE UP (ref 3.5–5.3)
RBC # BLD: 2.8 M/UL — LOW (ref 3.8–5.2)
RBC # BLD: 2.99 M/UL — LOW (ref 3.8–5.2)
RBC # FLD: 14.6 % — HIGH (ref 10.3–14.5)
RBC # FLD: 14.7 % — HIGH (ref 10.3–14.5)
SODIUM SERPL-SCNC: 141 MMOL/L — SIGNIFICANT CHANGE UP (ref 135–145)
WBC # BLD: 19.2 K/UL — HIGH (ref 3.8–10.5)
WBC # BLD: 26.2 K/UL — HIGH (ref 3.8–10.5)
WBC # FLD AUTO: 19.2 K/UL — HIGH (ref 3.8–10.5)
WBC # FLD AUTO: 26.2 K/UL — HIGH (ref 3.8–10.5)

## 2019-05-25 PROCEDURE — 74176 CT ABD & PELVIS W/O CONTRAST: CPT | Mod: 26

## 2019-05-25 RX ORDER — SODIUM CHLORIDE 9 MG/ML
1000 INJECTION, SOLUTION INTRAVENOUS
Refills: 0 | Status: DISCONTINUED | OUTPATIENT
Start: 2019-05-25 | End: 2019-05-25

## 2019-05-25 RX ADMIN — Medication 15 MILLIGRAM(S): at 06:30

## 2019-05-25 RX ADMIN — Medication 15 MILLIGRAM(S): at 13:45

## 2019-05-25 RX ADMIN — HEPARIN SODIUM 5000 UNIT(S): 5000 INJECTION INTRAVENOUS; SUBCUTANEOUS at 05:57

## 2019-05-25 RX ADMIN — HEPARIN SODIUM 5000 UNIT(S): 5000 INJECTION INTRAVENOUS; SUBCUTANEOUS at 13:15

## 2019-05-25 RX ADMIN — Medication 15 MILLIGRAM(S): at 18:01

## 2019-05-25 RX ADMIN — Medication 15 MILLIGRAM(S): at 05:55

## 2019-05-25 RX ADMIN — CEFTRIAXONE 100 GRAM(S): 500 INJECTION, POWDER, FOR SOLUTION INTRAMUSCULAR; INTRAVENOUS at 13:15

## 2019-05-25 RX ADMIN — Medication 15 MILLIGRAM(S): at 13:15

## 2019-05-25 RX ADMIN — Medication 650 MILLIGRAM(S): at 00:11

## 2019-05-25 RX ADMIN — HEPARIN SODIUM 5000 UNIT(S): 5000 INJECTION INTRAVENOUS; SUBCUTANEOUS at 22:00

## 2019-05-25 RX ADMIN — Medication 650 MILLIGRAM(S): at 00:50

## 2019-05-25 RX ADMIN — Medication 15 MILLIGRAM(S): at 18:31

## 2019-05-25 NOTE — PROGRESS NOTE ADULT - SUBJECTIVE AND OBJECTIVE BOX
Subjective    Objective    Vital signs  T(F): , Max: 98.5 (05-24-19 @ 16:00)  HR: 86 (05-25-19 @ 05:28)  BP: 102/68 (05-25-19 @ 05:28)  SpO2: 98% (05-25-19 @ 05:28)  Wt(kg): --    Output     05-24 @ 07:01  -  05-25 @ 07:00  --------------------------------------------------------  IN: 3385 mL / OUT: 4275 mL / NET: -890 mL        Gen awake alert nad axox3  Abd  Back      Labs      05-24 @ 11:01    WBC 3.7   / Hct 30.8  / SCr 0.73       Urine Cx: ?  Blood Cx: ?    Imaging Subjective  feeling well without complaints; pain is controlled   Objective    Vital signs  T(F): , Max: 98.5 (05-24-19 @ 16:00)  HR: 86 (05-25-19 @ 05:28)  BP: 102/68 (05-25-19 @ 05:28)  SpO2: 98% (05-25-19 @ 05:28)  Wt(kg): --    Output     05-24 @ 07:01  -  05-25 @ 07:00  --------------------------------------------------------  IN: 3385 mL / OUT: 4275 mL / NET: -890 mL        Gen awake alert nad axox3  Abd soft flat ntnd   Back tubes in place no hematoma/ ecchymosis  urine urines dilute strawberry    seth yellow     Labs      05-24 @ 11:01    WBC 3.7   / Hct 30.8  / SCr 0.73         Imaging  p

## 2019-05-26 ENCOUNTER — TRANSCRIPTION ENCOUNTER (OUTPATIENT)
Age: 39
End: 2019-05-26

## 2019-05-26 VITALS
OXYGEN SATURATION: 98 % | SYSTOLIC BLOOD PRESSURE: 128 MMHG | DIASTOLIC BLOOD PRESSURE: 88 MMHG | RESPIRATION RATE: 17 BRPM | TEMPERATURE: 98 F | HEART RATE: 90 BPM

## 2019-05-26 LAB
CULTURE RESULTS: SIGNIFICANT CHANGE UP
CULTURE RESULTS: SIGNIFICANT CHANGE UP
HCT VFR BLD CALC: 26.1 % — LOW (ref 34.5–45)
HGB BLD-MCNC: 8.4 G/DL — LOW (ref 11.5–15.5)
MCHC RBC-ENTMCNC: 26.9 PG — LOW (ref 27–34)
MCHC RBC-ENTMCNC: 32 GM/DL — SIGNIFICANT CHANGE UP (ref 32–36)
MCV RBC AUTO: 84.1 FL — SIGNIFICANT CHANGE UP (ref 80–100)
PLATELET # BLD AUTO: 179 K/UL — SIGNIFICANT CHANGE UP (ref 150–400)
RBC # BLD: 3.1 M/UL — LOW (ref 3.8–5.2)
RBC # FLD: 14.6 % — HIGH (ref 10.3–14.5)
SPECIMEN SOURCE: SIGNIFICANT CHANGE UP
SPECIMEN SOURCE: SIGNIFICANT CHANGE UP
WBC # BLD: 19 K/UL — HIGH (ref 3.8–10.5)
WBC # FLD AUTO: 19 K/UL — HIGH (ref 3.8–10.5)

## 2019-05-26 RX ORDER — CEFDINIR 250 MG/5ML
1 POWDER, FOR SUSPENSION ORAL
Qty: 10 | Refills: 0
Start: 2019-05-26 | End: 2019-05-30

## 2019-05-26 RX ORDER — OXYCODONE HYDROCHLORIDE 5 MG/1
1 TABLET ORAL
Qty: 12 | Refills: 0
Start: 2019-05-26

## 2019-05-26 RX ORDER — DOCUSATE SODIUM 100 MG
1 CAPSULE ORAL
Qty: 0 | Refills: 0 | DISCHARGE

## 2019-05-26 RX ADMIN — Medication 15 MILLIGRAM(S): at 03:30

## 2019-05-26 RX ADMIN — Medication 15 MILLIGRAM(S): at 09:26

## 2019-05-26 RX ADMIN — Medication 15 MILLIGRAM(S): at 02:43

## 2019-05-26 RX ADMIN — CEFTRIAXONE 100 GRAM(S): 500 INJECTION, POWDER, FOR SOLUTION INTRAMUSCULAR; INTRAVENOUS at 13:23

## 2019-05-26 RX ADMIN — HEPARIN SODIUM 5000 UNIT(S): 5000 INJECTION INTRAVENOUS; SUBCUTANEOUS at 05:41

## 2019-05-26 RX ADMIN — HEPARIN SODIUM 5000 UNIT(S): 5000 INJECTION INTRAVENOUS; SUBCUTANEOUS at 13:24

## 2019-05-26 NOTE — DISCHARGE NOTE NURSING/CASE MANAGEMENT/SOCIAL WORK - NSDCDPATPORTLINK_GEN_ALL_CORE
You can access the [facility] Portal, offered by [facility], by registering with the following website: [email or web address]

## 2019-05-26 NOTE — PROGRESS NOTE ADULT - SUBJECTIVE AND OBJECTIVE BOX
Subjective  feeling well without complaints   Objective    Vital signs  T(F): , Max: 98.1 (05-26-19 @ 01:45)  HR: 84 (05-26-19 @ 05:24)  BP: 111/73 (05-26-19 @ 05:24)  SpO2: 98% (05-26-19 @ 05:24)  Wt(kg): --    Output     05-25 @ 07:01  -  05-26 @ 07:00  --------------------------------------------------------  IN: 900 mL / OUT: 2075 mL / NET: -1175 mL        Gen awake alert nad axox3  Abd soft ntnd   Back tubes in place no hematoma/ ecchymosis urines light pink    nonpalp bladder     Labs      05-26 @ 06:49    WBC 19.0  / Hct 26.1  / SCr --       05-25 @ 13:34    WBC 19.2  / Hct 25.1  / SCr --

## 2019-05-26 NOTE — DISCHARGE NOTE PROVIDER - CARE PROVIDER_API CALL
Hoenig, David M (MD)  Urology  [facility] Dept of Urology, [address] [place] [address]  Phone: [phone]  Fax: [phone]  Follow Up Time:

## 2019-05-26 NOTE — PROGRESS NOTE ADULT - ATTENDING COMMENTS
pt seen and examined  doing well today  Agree with plan above  CT reviewed- no further surgery needed at this time.  plan for second (NU) to be removed this morning if all well  d/c planning
Pt seen and examined  Feeling well at this time.  Agree with plan above- recheck H&H this pm, f/u on CT scan for residual calculi

## 2019-05-26 NOTE — PROGRESS NOTE ADULT - ASSESSMENT
A/P: 38y Female s/p right pcnl     the pt required two nts to control bleeding at the conclusion of the surgery and will require inpt mgmt  ct and tov in am   DVT prophylaxis/OOB  Incentive spirometry  Strict I&O's  Analgesia and antiemetics as needed  Diet  AM labs
A/P: 38y Female s/p right pcnl     the pt required two nts to control bleeding at the conclusion of the surgery and will require inpt mgmt  ct and tov   DVT prophylaxis/OOB  Incentive spirometry  Strict I&O's  Analgesia and antiemetics as needed  Diet  AM labs
A/P: 38y Female s/p right pcnl   upper nt removed successfully at the bedside, will eval back and then remove second   likely dc home today

## 2019-05-26 NOTE — DISCHARGE NOTE PROVIDER - HOSPITAL COURSE
39yo f with a pmh of htn and nephrolithiasis who arrived at [facility] on 5/24/19 for a right percutaneous nephrolithotomy     Please see operative report  for further details.    post op she did well. her vitals were stable, she tolerated diet, her pain was controlled, she ambulated  and her labs were stable.    underwent successful tov. ct showed no stone burden.    on pod 2 she underwent successful nt removal in a step wise fashion.     dcd home with cefdinir and to fu in two weeks

## 2019-05-26 NOTE — DISCHARGE NOTE PROVIDER - NSDCCPCAREPLAN_GEN_ALL_CORE_FT
PRINCIPAL DISCHARGE DIAGNOSIS  Diagnosis: Right nephrolithiasis  Assessment and Plan of Treatment: Call the office if you experience fever, chills, uncontrolled pain, the inability to tolerate liquids, or the urine does not flow   to promote wound healing do not take a bath, continue to walk frequently, return to daily living activities slowly, no heavy lifting greater than 10lbs for 4-6 weeks, follow up Dr Hoenig in two weeks   There may be leakage from your back, you may replace the gauze and tape as needed. The hole will close in a few days. Sitting upright will help stop the leakage.        SECONDARY DISCHARGE DIAGNOSES  Diagnosis: HTN (hypertension)  Assessment and Plan of Treatment: continue home medication

## 2019-05-26 NOTE — DISCHARGE NOTE PROVIDER - CARE PROVIDERS DIRECT ADDRESSES
[email or web address]

## 2019-05-30 LAB — COMPN STONE: SIGNIFICANT CHANGE UP

## 2019-06-01 LAB — SURGICAL PATHOLOGY STUDY: SIGNIFICANT CHANGE UP

## 2019-06-21 ENCOUNTER — APPOINTMENT (OUTPATIENT)
Dept: UROLOGY | Facility: CLINIC | Age: 39
End: 2019-06-21
Payer: COMMERCIAL

## 2019-06-21 PROCEDURE — 99214 OFFICE O/P EST MOD 30 MIN: CPT | Mod: 24

## 2019-06-21 NOTE — HISTORY OF PRESENT ILLNESS
[None] : no symptoms [FreeTextEntry1] : Pt returns for metabolic evaluation and prevention of future stone disease following recent surgery for stone.  At issue today is review of the stone analysis, risk factors for future stone episodes and establishing whether they have pure dietary, metabolic, or mixed causes for their stone risks which is unrelated to the surgical management of their stone disease.\par \par They underwent 2 access right PCNL on 5/24/19 with Dr. Hernandez.  NU, Nephrostomy, both removed\par \par Stone analysis:80% Calcium phosphate (apatite) 20% Calcium oxalate monohydrate\par \par Feeling back to normal at this time. \par \par \par

## 2019-06-21 NOTE — ASSESSMENT
[FreeTextEntry1] : Diet modification reviewed at length- increasing fluids (primarily water), citrus is good, and decreasing/moderating salt, animal flesh protein, oxalate containing foods, and moderation of calcium intake (1000 mg/day is USRDA).\par \par I reviewed with the patient the risks of metabolic stone disease given their underlying risk parameters (all of which include large stones, multiple stones, bilateral stones, family history, and young age), and the indications for 24 hour urine metabolic assessment.\par \par We also discussed benefits of regular exercise and weight loss as independent risk reducers for stones.\par \par 1. Diet modification as discussed\par 2. 24 hour urine collection x 2 in the next few weeks\par 3. RTC with renal US in 6 to 8 weeks\par \par \par This includes increasing fluid intake to produce 2 to 2.5 liters of urine per day (approx 3 liters intake), and should be primarily water.  \par \par Calcium intake should be approximately 1000 mg per day.  \par \par Oxalate intake should be reduced- most common sources in diet which have very high levels include peanuts/nuts, tea, coffee, chocolate, spinach, beets, rhubarb, swiss chard.  I've also given/directed to a list with other high oxalate.\par   \par Animal flesh protein should be controlled- approx 4 to 6 ounces per day, with some vegetarian days included in the week.  Studies have shown that vegetarians have half the risk of stones of people who eat only 4 ounces per day of meat or fish of any kind (beef, chicken, fish, shellfish, pork, etc), indicating that a vegetarian lifestyle can decrease future stone risks.\par \par Finally, salt intake should be reduced as high levels in the diet will increase urinary calcium.  <2400 mg per day on a low sodium diet is strongly recommended.\par \par Citrate is a benefit; dutch and limes with most citrate and least sugar- recommend 'a lemon or lime a day'; easiest with concentrate, mixed into water or other beverages.\par \par Lifestyle changes: regular exercise and weight loss both are independent risk-reducers for stones.\par \par In addition, for further details online, go to [email or web address].com <[email or web address].com> ---> in the lower left column there is a link for "diet resources", and review or download.\par \par \par

## 2019-06-21 NOTE — REVIEW OF SYSTEMS
[Date of last menstrual period ____] : date of last menstrual period: [unfilled] [Urine Infection (bladder/kidney)] : bladder/kidney infection [History of kidney stones] : history of kidney stones [Negative] : Heme/Lymph [FreeTextEntry3] : frequent urination

## 2019-06-21 NOTE — PHYSICAL EXAM
[General Appearance - Well Developed] : well developed [Normal Appearance] : normal appearance [General Appearance - Well Nourished] : well nourished [Well Groomed] : well groomed [General Appearance - In No Acute Distress] : no acute distress [Edema] : no peripheral edema [] : no respiratory distress [Respiration, Rhythm And Depth] : normal respiratory rhythm and effort [Exaggerated Use Of Accessory Muscles For Inspiration] : no accessory muscle use [Abdomen Soft] : soft [Abdomen Tenderness] : non-tender [Costovertebral Angle Tenderness] : no ~M costovertebral angle tenderness [FreeTextEntry1] : well healed right pCNL site [Normal Station and Gait] : the gait and station were normal for the patient's age

## 2019-07-10 PROCEDURE — 86900 BLOOD TYPING SEROLOGIC ABO: CPT

## 2019-07-10 PROCEDURE — C1726: CPT

## 2019-07-10 PROCEDURE — 81025 URINE PREGNANCY TEST: CPT

## 2019-07-10 PROCEDURE — 87070 CULTURE OTHR SPECIMN AEROBIC: CPT

## 2019-07-10 PROCEDURE — 71045 X-RAY EXAM CHEST 1 VIEW: CPT

## 2019-07-10 PROCEDURE — C1769: CPT

## 2019-07-10 PROCEDURE — 80048 BASIC METABOLIC PNL TOTAL CA: CPT

## 2019-07-10 PROCEDURE — 82365 CALCULUS SPECTROSCOPY: CPT

## 2019-07-10 PROCEDURE — C1887: CPT

## 2019-07-10 PROCEDURE — 74176 CT ABD & PELVIS W/O CONTRAST: CPT

## 2019-07-10 PROCEDURE — 76000 FLUOROSCOPY <1 HR PHYS/QHP: CPT

## 2019-07-10 PROCEDURE — C1889: CPT

## 2019-07-10 PROCEDURE — 88300 SURGICAL PATH GROSS: CPT

## 2019-07-10 PROCEDURE — 85027 COMPLETE CBC AUTOMATED: CPT

## 2019-07-10 PROCEDURE — 87086 URINE CULTURE/COLONY COUNT: CPT

## 2019-07-10 PROCEDURE — 86901 BLOOD TYPING SEROLOGIC RH(D): CPT

## 2019-07-10 PROCEDURE — C1729: CPT

## 2019-07-11 ENCOUNTER — APPOINTMENT (OUTPATIENT)
Dept: UROLOGY | Facility: CLINIC | Age: 39
End: 2019-07-11
Payer: COMMERCIAL

## 2019-07-11 VITALS
HEART RATE: 72 BPM | TEMPERATURE: 98.4 F | DIASTOLIC BLOOD PRESSURE: 80 MMHG | WEIGHT: 128 LBS | RESPIRATION RATE: 14 BRPM | HEIGHT: 64 IN | BODY MASS INDEX: 21.85 KG/M2 | SYSTOLIC BLOOD PRESSURE: 143 MMHG

## 2019-07-11 PROCEDURE — 99214 OFFICE O/P EST MOD 30 MIN: CPT | Mod: 24

## 2019-07-12 NOTE — ASSESSMENT
[FreeTextEntry1] : Renal cyst:\par Discussed that Renal cysts are a common finding on routine radiological studies. Autopsy studies in patients over the age of 50 reveal greater than a 50% chance of having at least one simple renal cyst.\par And that renal cysts may be classified as simple or complex depending how they look on imaging. Discussed complexity of renal cysts and its implications as regards malignancy. \par \par Disorder of calcium metabolism:\par Renal Ultrasound: No calculus or hydronephrosis. \par Discussed Stone analysis.\par Ca, PTH, Uric acid and 24 Hr urine kidney stone risk profile pending. \par \par Recommended kidney stone prevention diet:\par Increase fluid intake to produce 2 to 2.5 liters of urine per day (approximately 3 liters intake), and should be primarily water. \par Salt intake should be reduced as salt will increase urinary calcium. Less then 2400 mg per day is strongly recommended.\par Citrate in diet helps prevent kidney stone. Recommended a lemon or lime a day mixed into water or other beverages. Other sources- oranges, grapefruits and berries. \par Oxalate intake should be reduced- most common sources in diet which have very high levels include peanuts/nuts, tea, coffee, chocolate, spinach, beets, rhubarb, swiss chard.\par Calcium intake should be approximately 1000 mg per day. \par Animal flesh protein should be controlled- approximately 4 to 6 ounces per day, with some vegetarian days included in the week. Studies have shown that vegetarians have half the risk of stones of people who eat only 4 ounces per day of meat or fish of any kind (beef, chicken, fish, shellfish, pork, etc). \par \par Return to office after labs or sooner if any issues. \par  \par \par

## 2019-07-12 NOTE — HISTORY OF PRESENT ILLNESS
[FreeTextEntry1] : 39 yo female presents for follow up. \par Patient primarily British speaking, with limited English. Visit conducted with help of British speaking staff.\par Denies any difficulty with urination. \par Denies dysuria, hematuria, lower abdominal or flank pain, fever, chills or rigors. \par s/p Right Percutaneous nephrolithotomy done on 5/24/19 at [facility] with Dr Hoenig.\par s/p Right Ureteroscopy, laser lithotripsy, stone extraction and ureteral stent placement done on 4/2/19 at [facility]. \par \par \par Initially seen for Kidney stone. \par Had been having off and on right flank pain, had Renal Ultrasound- right kidney stone. \par Has prior history of kidney stone, s/p PCNL at [facility]. No family history. \par

## 2019-08-08 ENCOUNTER — APPOINTMENT (OUTPATIENT)
Dept: UROLOGY | Facility: CLINIC | Age: 39
End: 2019-08-08
Payer: COMMERCIAL

## 2019-08-08 VITALS
DIASTOLIC BLOOD PRESSURE: 87 MMHG | RESPIRATION RATE: 14 BRPM | SYSTOLIC BLOOD PRESSURE: 145 MMHG | WEIGHT: 128 LBS | HEIGHT: 64 IN | BODY MASS INDEX: 21.85 KG/M2 | HEART RATE: 80 BPM

## 2019-08-08 DIAGNOSIS — E83.50 UNSPECIFIED DISORDER OF CALCIUM METABOLISM: ICD-10-CM

## 2019-08-08 PROCEDURE — 99214 OFFICE O/P EST MOD 30 MIN: CPT | Mod: 24

## 2019-08-16 PROBLEM — E83.50 DISORDER OF CALCIUM METABOLISM: Status: ACTIVE | Noted: 2019-06-21

## 2019-08-16 NOTE — ASSESSMENT
[FreeTextEntry1] : Kidney stone:\par Disorder of Calcium metabolism:\par Discussed recent lab work. \par Recommended Kidney stone prevention diet:\par Good oral hydration so that urine is clear to light yellow, usually 1.5 to 2 Liters of fluids, mainly water\par Increasing Citrate in diet by consuming citrus fruits and juices- dutch, limes, oranges, grapefruits and berries \par Less red meat\par Less salt\par Limit foods with oxalate like- dark green vegetables, rhubarb, chocolate, wheat bran, nuts, cranberries, and beans\par \par \par Follow with Primary care physician. \par Return to clinic as needed. \par

## 2019-08-16 NOTE — HISTORY OF PRESENT ILLNESS
[FreeTextEntry1] : 37 yo female presents for follow up. \par Patient primarily Belarusian speaking, with limited English. Visit conducted with help of Belarusian speaking staff.\par Had labs done.\par Denies any difficulty with urination. \par Denies dysuria, hematuria, lower abdominal or flank pain, fever, chills or rigors. \par s/p Right Percutaneous nephrolithotomy done on 5/24/19 at [facility] with Dr Hoenig.\par s/p Right Ureteroscopy, laser lithotripsy, stone extraction and ureteral stent placement done on 4/2/19 at [facility]. \par \par \par Initially seen for Kidney stone. \par Had been having off and on right flank pain, had Renal Ultrasound- right kidney stone. \par Has prior history of kidney stone, s/p PCNL at [facility]. No family history. \par

## 2020-12-21 PROBLEM — N39.0 ACUTE UTI: Status: RESOLVED | Noted: 2019-03-11 | Resolved: 2020-12-21

## 2021-07-13 ENCOUNTER — APPOINTMENT (OUTPATIENT)
Dept: UROLOGY | Facility: CLINIC | Age: 41
End: 2021-07-13
Payer: COMMERCIAL

## 2021-07-13 DIAGNOSIS — Z87.442 PERSONAL HISTORY OF URINARY CALCULI: ICD-10-CM

## 2021-07-13 PROCEDURE — 99213 OFFICE O/P EST LOW 20 MIN: CPT

## 2021-07-13 PROCEDURE — 99072 ADDL SUPL MATRL&STAF TM PHE: CPT

## 2021-07-13 NOTE — ASSESSMENT
[FreeTextEntry1] : Flank pain:\par History of kidney stone:\par Will get Urinalysis with reflex Urine culture. \par Will get Renal and Bladder Ultrasound. \par \par Return to office after Ultrasound.

## 2021-07-13 NOTE — PHYSICAL EXAM
[Normal Appearance] : normal appearance [General Appearance - In No Acute Distress] : no acute distress [Abdomen Soft] : soft [Abdomen Tenderness] : non-tender [Costovertebral Angle Tenderness] : no ~M costovertebral angle tenderness [Skin Color & Pigmentation] : normal skin color and pigmentation [FreeTextEntry1] : normal peripheral circulation  [] : no respiratory distress [Oriented To Time, Place, And Person] : oriented to person, place, and time [Normal Station and Gait] : the gait and station were normal for the patient's age [No Focal Deficits] : no focal deficits

## 2021-07-13 NOTE — HISTORY OF PRESENT ILLNESS
[FreeTextEntry1] : 39 yo female presents for right flank pain. \par Has been having off and on right flank pain, 3-4/10. \par Denies any difficulty with urination. \par Denies dysuria, hematuria, nausea, vomiting, fever, chills or rigors. \par \par s/p Right Percutaneous nephrolithotomy done on 5/24/19 at [facility] with Dr Hoenig.\par s/p Right Ureteroscopy, laser lithotripsy, stone extraction and ureteral stent placement done on 4/2/19 at [facility]. \par \par Initially seen for Kidney stone. \par Had been having off and on right flank pain, had Renal Ultrasound- right kidney stone. \par Has prior history of kidney stone, s/p PCNL at [facility]. No family history. \par

## 2021-07-14 LAB
APPEARANCE: CLEAR
BILIRUBIN URINE: NEGATIVE
BLOOD URINE: NEGATIVE
COLOR: NORMAL
GLUCOSE QUALITATIVE U: NEGATIVE
KETONES URINE: NEGATIVE
LEUKOCYTE ESTERASE URINE: NEGATIVE
NITRITE URINE: NEGATIVE
PH URINE: 7
PROTEIN URINE: NEGATIVE
SPECIFIC GRAVITY URINE: 1.02
UROBILINOGEN URINE: NORMAL

## 2021-09-14 ENCOUNTER — APPOINTMENT (OUTPATIENT)
Dept: UROLOGY | Facility: CLINIC | Age: 41
End: 2021-09-14
Payer: COMMERCIAL

## 2021-09-14 VITALS — HEART RATE: 86 BPM | SYSTOLIC BLOOD PRESSURE: 158 MMHG | DIASTOLIC BLOOD PRESSURE: 92 MMHG | TEMPERATURE: 98.2 F

## 2021-09-14 DIAGNOSIS — N28.1 CYST OF KIDNEY, ACQUIRED: ICD-10-CM

## 2021-09-14 PROCEDURE — 99214 OFFICE O/P EST MOD 30 MIN: CPT

## 2021-09-14 NOTE — PHYSICAL EXAM
[General Appearance - In No Acute Distress] : no acute distress [Normal Appearance] : normal appearance [Skin Color & Pigmentation] : normal skin color and pigmentation [FreeTextEntry1] : normal peripheral circulation  [] : no respiratory distress Spontaneous, unlabored and symmetrical [Oriented To Time, Place, And Person] : oriented to person, place, and time

## 2021-09-14 NOTE — HISTORY OF PRESENT ILLNESS
[FreeTextEntry1] : 39 yo female presents for follow up. \par Continues to have right flank pain. \par Had Renal and Bladder Ultrasound. \par \par Recently seen for right flank pain. \par Had been having off and on right flank pain, 3-4/10. \par Denied any difficulty with urination. \par Denied dysuria, hematuria, nausea, vomiting, fever, chills or rigors. \par \par Status post Right Percutaneous nephrolithotomy done on 5/24/19 at [facility] with Dr Hoenig.\par Status post Right Ureteroscopy, laser lithotripsy, stone extraction and ureteral stent placement done on 4/2/19 at [facility]. \par \par Initially seen for Kidney stone. \par Had been having off and on right flank pain, had Renal Ultrasound- right kidney stone. \par Has prior history of kidney stone, s/p PCNL at [facility]. No family history. \par

## 2021-09-14 NOTE — ASSESSMENT
[FreeTextEntry1] : Reviewed outside records. \par 6 mm right lower pole kidney stone. \par \par Renal cyst:\par Stable Renal cysts. \par \par Right flank pain:\par Kidney stone:\par Discussed the management options for non obstructing kidney stones- watch and wait Vs Ureteroscopy Vs Shock wave lithotripsy- if radio-opaque or ultrasound amenable. \par Risks and benefits of each modality were discussed. \par Will get CT scan. \par  \par Return to office after CT scan.

## 2021-09-14 NOTE — REASON FOR VISIT
[[employer] ] : provided by [employer]   [Follow-up Visit ___] : a follow-up visit  for [unfilled] [Interpreters_IDNumber] : [id number] [Interpreters_FullName] : Will

## 2021-10-19 ENCOUNTER — APPOINTMENT (OUTPATIENT)
Dept: UROLOGY | Facility: CLINIC | Age: 41
End: 2021-10-19
Payer: COMMERCIAL

## 2021-10-19 VITALS — TEMPERATURE: 97.8 F

## 2021-10-19 DIAGNOSIS — N20.0 CALCULUS OF KIDNEY: ICD-10-CM

## 2021-10-19 DIAGNOSIS — R10.9 UNSPECIFIED ABDOMINAL PAIN: ICD-10-CM

## 2021-10-19 PROCEDURE — 99213 OFFICE O/P EST LOW 20 MIN: CPT

## 2021-10-26 PROBLEM — R10.9 RIGHT FLANK PAIN: Status: ACTIVE | Noted: 2021-07-13

## 2021-10-26 PROBLEM — N20.0 KIDNEY STONE ON RIGHT SIDE: Status: ACTIVE | Noted: 2019-03-07

## 2021-10-26 NOTE — REASON FOR VISIT
[[employer] ] : provided by [employer]   [Follow-up Visit ___] : a follow-up visit  for [unfilled] [Interpreters_IDNumber] : [id number] [Interpreters_FullName] : Oneil

## 2021-10-26 NOTE — ASSESSMENT
[FreeTextEntry1] : Reviewed outside records. \par Punctate non obstructing kidney stones. \par \par Kidney stone:\par Linear calcification along posterior margin of right kidney: likely scarring. No active intervention at this point. \par Reiterated kidney stone prevention diet. \par \par Flank pain:\par Linear calcification along posterior margin of right kidney: likely scarring, unlikely to be cause of flank pain. \par Recommended follow up with PCP to rule out other causes. \par \par Non  findings were discussed and asked Pt to discuss them with PCP and respective physicians. Pt was given copy of the CT scan. \par \par Follow with Primary care physician. \par Return to clinic as needed. \par \par \par \par \par \par

## 2021-10-26 NOTE — HISTORY OF PRESENT ILLNESS
[FreeTextEntry1] : 41 yo female presents for follow up. \par Continues to have right flank pain. \par Had CT scan.\par \par Recently seen for right flank pain. \par Had been having off and on right flank pain, 3-4/10. \par Denied any difficulty with urination. \par Denied dysuria, hematuria, nausea, vomiting, fever, chills or rigors. \par \par Status post Right Percutaneous nephrolithotomy done on 5/24/19 at [facility] with Dr Hoenig.\par Status post Right Ureteroscopy, laser lithotripsy, stone extraction and ureteral stent placement done on 4/2/19 at [facility]. \par \par Initially seen for Kidney stone. \par Had been having off and on right flank pain, had Renal Ultrasound- right kidney stone. \par Has prior history of kidney stone, s/p PCNL at [facility]. No family history. \par

## 2022-02-07 NOTE — ASU PREOP CHECKLIST - NOTHING BY MOUTH SINCE
Products Recommended: Elta Sunblock daily recommended. Detail Level: Generalized General Sunscreen Counseling: I recommended a broad spectrum sunscreen with a SPF of 30 or higher.  Sunscreens should be applied at least 15 minutes prior to expected sun exposure and then every 2 hours after that as long as sun exposure continues. If swimming or sweating, need to use a water resistant sunblock and it should have an 80 minute time listed on the bottle. Need to reapply every 80 minutes in those instances. Encouraged sun protective clothing and hats. 02-Apr-2019 00:00

## 2023-07-29 NOTE — LETTER BODY
Goal Outcome Evaluation:      Plan of Care Reviewed With: patient    Overall Patient Progress: no change    Outcome Evaluation: Pt is alert and oriented x 4. C/o dizziness prn meclizine which was effective. BP stable. Denies pain.  Assist of x1 with walker and a gait belt for transfers.Lbm 7/28. Pt had a shower.       [Dear  ___] : Dear ~DAVID, [Courtesy Letter:] : I had the pleasure of seeing your patient, [unfilled], in my office today. [Please see my note below.] : Please see my note below. [Sincerely,] : Sincerely, [FreeTextEntry3] : Kartik Hernandez MD\par  of Urology\par [employer]\par \par Offices:\par [facility] @\par [address], [place] [address]\par and\par [address], [place] [address], [address]\par and\par [address] [address]\par \par TEL: [phone]\par FAX: [phone]

## 2023-08-15 NOTE — REASON FOR VISIT
PAST SURGICAL HISTORY:  No significant past surgical history      [Follow-up Visit ___] : a follow-up visit  for [unfilled]

## 2024-10-29 NOTE — REASON FOR VISIT
10/29/24 2:39 PM     Chart reviewed for Mammogram was/were not submitted to the patient's insurance.     Sofia Lilly MA   [facility]   [Follow-up Visit ___] : a follow-up visit  for [unfilled] [[employer] ] : provided by [employer]   [FreeTextEntry1] : [id number] [FreeTextEntry2] : Hao

## 2024-12-30 ENCOUNTER — OFFICE (OUTPATIENT)
Dept: URBAN - METROPOLITAN AREA CLINIC 105 | Facility: CLINIC | Age: 44
Setting detail: OPHTHALMOLOGY
End: 2024-12-30
Payer: MEDICAID

## 2024-12-30 DIAGNOSIS — H43.393: ICD-10-CM

## 2024-12-30 DIAGNOSIS — G43.109: ICD-10-CM

## 2024-12-30 DIAGNOSIS — H31.003: ICD-10-CM

## 2024-12-30 DIAGNOSIS — H52.7: ICD-10-CM

## 2024-12-30 PROCEDURE — 92015 DETERMINE REFRACTIVE STATE: CPT | Performed by: OPHTHALMOLOGY

## 2024-12-30 PROCEDURE — 92014 COMPRE OPH EXAM EST PT 1/>: CPT | Performed by: OPHTHALMOLOGY

## 2024-12-30 ASSESSMENT — KERATOMETRY
OS_K2POWER_DIOPTERS: 43.00
OD_K1POWER_DIOPTERS: 41.75
OD_AXISANGLE_DEGREES: 097
OD_K2POWER_DIOPTERS: 43.25
OS_K1POWER_DIOPTERS: 42.00
OS_AXISANGLE_DEGREES: 081

## 2024-12-30 ASSESSMENT — REFRACTION_CURRENTRX
OS_CYLINDER: -0.50
OD_SPHERE: +1.25
OD_OVR_VA: 20/
OD_AXIS: 180
OS_VPRISM_DIRECTION: SV
OS_OVR_VA: 20/
OS_SPHERE: +1.25
OS_AXIS: 149
OD_CYLINDER: -0.75
OD_VPRISM_DIRECTION: SV

## 2024-12-30 ASSESSMENT — CORNEAL SURGICAL SCARRING: OD_SCARRING: ANTERIOR

## 2024-12-30 ASSESSMENT — REFRACTION_AUTOREFRACTION
OD_SPHERE: -0.50
OS_SPHERE: -0.25
OD_CYLINDER: -0.50
OD_AXIS: 003
OS_AXIS: 145
OS_CYLINDER: -0.50

## 2024-12-30 ASSESSMENT — REFRACTION_MANIFEST
OS_CYLINDER: SPH
OS_VA1: 20/20
OD_VA1: 20/20
OD_SPHERE: -0.50
OD_ADD: +1.50
OD_AXIS: 180
OS_ADD: +1.50
OS_SPHERE: -0.25
OD_CYLINDER: -0.25

## 2024-12-30 ASSESSMENT — TONOMETRY
OD_IOP_MMHG: 18
OS_IOP_MMHG: 18

## 2024-12-30 ASSESSMENT — VISUAL ACUITY
OS_BCVA: 20/20
OD_BCVA: 20/20-1

## 2024-12-30 ASSESSMENT — CONFRONTATIONAL VISUAL FIELD TEST (CVF)
OS_FINDINGS: FULL
OD_FINDINGS: FULL